# Patient Record
Sex: MALE | Race: BLACK OR AFRICAN AMERICAN | NOT HISPANIC OR LATINO | Employment: FULL TIME | URBAN - METROPOLITAN AREA
[De-identification: names, ages, dates, MRNs, and addresses within clinical notes are randomized per-mention and may not be internally consistent; named-entity substitution may affect disease eponyms.]

---

## 2018-07-09 ENCOUNTER — HOSPITAL ENCOUNTER (EMERGENCY)
Facility: HOSPITAL | Age: 23
Discharge: HOME | End: 2018-07-09
Attending: EMERGENCY MEDICINE

## 2018-07-09 VITALS
TEMPERATURE: 99 F | HEIGHT: 69 IN | OXYGEN SATURATION: 99 % | DIASTOLIC BLOOD PRESSURE: 86 MMHG | WEIGHT: 230 LBS | RESPIRATION RATE: 18 BRPM | BODY MASS INDEX: 34.07 KG/M2 | HEART RATE: 80 BPM | SYSTOLIC BLOOD PRESSURE: 124 MMHG

## 2018-07-09 DIAGNOSIS — E86.0 DEHYDRATION: ICD-10-CM

## 2018-07-09 DIAGNOSIS — R25.2 MUSCLE CRAMPING: ICD-10-CM

## 2018-07-09 DIAGNOSIS — R11.0 NAUSEA: Primary | ICD-10-CM

## 2018-07-09 LAB
ALBUMIN SERPL BCP-MCNC: 4.1 G/DL
ALP SERPL-CCNC: 53 U/L
ALT SERPL W/O P-5'-P-CCNC: 19 U/L
ANION GAP SERPL CALC-SCNC: 8 MMOL/L
AST SERPL-CCNC: 26 U/L
BASOPHILS # BLD AUTO: 0.01 K/UL
BASOPHILS NFR BLD: 0.2 %
BILIRUB SERPL-MCNC: 0.6 MG/DL
BILIRUB UR QL STRIP: NEGATIVE
BUN SERPL-MCNC: 15 MG/DL
CALCIUM SERPL-MCNC: 9.4 MG/DL
CHLORIDE SERPL-SCNC: 104 MMOL/L
CK SERPL-CCNC: 526 U/L
CLARITY UR: CLEAR
CO2 SERPL-SCNC: 28 MMOL/L
COLOR UR: YELLOW
CREAT SERPL-MCNC: 1.4 MG/DL
DIFFERENTIAL METHOD: NORMAL
EOSINOPHIL # BLD AUTO: 0.2 K/UL
EOSINOPHIL NFR BLD: 3.9 %
ERYTHROCYTE [DISTWIDTH] IN BLOOD BY AUTOMATED COUNT: 13.3 %
EST. GFR  (AFRICAN AMERICAN): >60 ML/MIN/1.73 M^2
EST. GFR  (NON AFRICAN AMERICAN): >60 ML/MIN/1.73 M^2
GLUCOSE SERPL-MCNC: 100 MG/DL
GLUCOSE UR QL STRIP: NEGATIVE
HCT VFR BLD AUTO: 42.8 %
HGB BLD-MCNC: 14.7 G/DL
HGB UR QL STRIP: NEGATIVE
KETONES UR QL STRIP: NEGATIVE
LEUKOCYTE ESTERASE UR QL STRIP: NEGATIVE
LYMPHOCYTES # BLD AUTO: 2.2 K/UL
LYMPHOCYTES NFR BLD: 40 %
MCH RBC QN AUTO: 29 PG
MCHC RBC AUTO-ENTMCNC: 34.3 G/DL
MCV RBC AUTO: 84 FL
MONOCYTES # BLD AUTO: 0.5 K/UL
MONOCYTES NFR BLD: 8.2 %
NEUTROPHILS # BLD AUTO: 2.7 K/UL
NEUTROPHILS NFR BLD: 47.5 %
NITRITE UR QL STRIP: NEGATIVE
PH UR STRIP: 7 [PH] (ref 5–8)
PLATELET # BLD AUTO: 276 K/UL
PMV BLD AUTO: 9.3 FL
POTASSIUM SERPL-SCNC: 4.1 MMOL/L
PROT SERPL-MCNC: 7.4 G/DL
PROT UR QL STRIP: NEGATIVE
RBC # BLD AUTO: 5.07 M/UL
SODIUM SERPL-SCNC: 140 MMOL/L
SP GR UR STRIP: 1.02 (ref 1–1.03)
URN SPEC COLLECT METH UR: ABNORMAL
UROBILINOGEN UR STRIP-ACNC: ABNORMAL EU/DL
WBC # BLD AUTO: 5.58 K/UL

## 2018-07-09 PROCEDURE — 80053 COMPREHEN METABOLIC PANEL: CPT

## 2018-07-09 PROCEDURE — 81003 URINALYSIS AUTO W/O SCOPE: CPT

## 2018-07-09 PROCEDURE — 96360 HYDRATION IV INFUSION INIT: CPT

## 2018-07-09 PROCEDURE — 25000003 PHARM REV CODE 250: Performed by: PHYSICIAN ASSISTANT

## 2018-07-09 PROCEDURE — 82550 ASSAY OF CK (CPK): CPT

## 2018-07-09 PROCEDURE — 85025 COMPLETE CBC W/AUTO DIFF WBC: CPT

## 2018-07-09 PROCEDURE — 99283 EMERGENCY DEPT VISIT LOW MDM: CPT | Mod: 25

## 2018-07-09 RX ADMIN — SODIUM CHLORIDE 1000 ML: 0.9 INJECTION, SOLUTION INTRAVENOUS at 01:07

## 2018-07-09 NOTE — ED PROVIDER NOTES
"Encounter Date: 7/9/2018    SCRIBE #1 NOTE: I, Kun Sudeep, am scribing for, and in the presence of,  Leona Humphrey NP. I have scribed the following portions of the note - Other sections scribed: HPI, ROS.       History     Chief Complaint   Patient presents with    Nausea     "I had a piece of baked chicken, and I got dizzy after and my left arm got numb". Pt states it lasted a couple of minutes, and now he just feels nauseous. 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 Male with no pertinent PMHx presents to the ED c/o nausea which began today after working outside. 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 He states he ate baked chicken earlier today. Pt had an episode of hand cramping while being outside today. Since being in the ED he reports his hand cramping is relieved and is not currently nauseous. Pt has not taken any meds for his symptoms. He denies abdominal pain.      The history is provided by the patient. No  was used.     Review of patient's allergies indicates:  No Known Allergies  History reviewed. No pertinent past medical history.  Past Surgical History:   Procedure Laterality Date    KNEE ARTHROSCOPY W/ LASER       History reviewed. No pertinent family history.  Social History   Substance Use Topics    Smoking status: Never Smoker    Smokeless tobacco: Never Used    Alcohol use No     Review of Systems   Constitutional: Negative for chills and fever.   HENT: Negative for ear pain, rhinorrhea and sore throat.    Eyes: Negative for redness.   Respiratory: Negative for shortness of breath.    Cardiovascular: Negative for chest pain.   Gastrointestinal: Positive for nausea. Negative for abdominal pain, diarrhea and vomiting.   Genitourinary: Negative for dysuria and hematuria.   Musculoskeletal: Negative for back pain and neck pain.        (+) hand cramping   Skin: Negative for rash. "   Neurological: Negative for weakness, numbness and headaches.   Hematological: Does not bruise/bleed easily.       Physical Exam     Initial Vitals [07/09/18 1321]   BP Pulse Resp Temp SpO2   (!) 143/91 84 16 98.8 °F (37.1 °C) 100 %      MAP       --         Physical Exam    Vitals reviewed.  Constitutional: He appears well-developed and well-nourished. He is not diaphoretic.  Non-toxic appearance. He does not have a sickly appearance. He does not appear ill. No distress.   HENT:   Head: Normocephalic and atraumatic.   Right Ear: External ear normal.   Left Ear: External ear normal.   Nose: Nose normal.   Mouth/Throat: Oropharynx is clear and moist. No oropharyngeal exudate.   Eyes: EOM are normal. Pupils are equal, round, and reactive to light.   Neck: Normal range of motion.   Cardiovascular: Normal rate, regular rhythm and normal heart sounds. Exam reveals no gallop and no friction rub.    No murmur heard.  Pulmonary/Chest: Breath sounds normal. No respiratory distress.   Abdominal: Soft. Bowel sounds are normal. He exhibits no distension and no mass. There is no tenderness. There is no rebound and no guarding.   Musculoskeletal: Normal range of motion.   Neurological: He is alert and oriented to person, place, and time. GCS eye subscore is 4. GCS verbal subscore is 5. GCS motor subscore is 6.   Skin: Skin is warm, dry and intact. No rash noted. No erythema.   Psychiatric: He has a normal mood and affect. Thought content normal.         ED Course   Procedures  Labs Reviewed   COMPREHENSIVE METABOLIC PANEL - Abnormal; Notable for the following:        Result Value    Alkaline Phosphatase 53 (*)     All other components within normal limits   CK - Abnormal; Notable for the following:      (*)     All other components within normal limits   URINALYSIS - Abnormal; Notable for the following:     Urobilinogen, UA 2.0-3.0 (*)     All other components within normal limits   CBC W/ AUTO DIFFERENTIAL           Imaging Results    None          Medical Decision Making:   ED Management:  This is evaluation of a 23-year-old male presenting with nausea and dizziness.  Physical Exam shows a non-toxic, afebrile, and well appearing male.  He appears well hydrated with moist mucous membranes.  On examination the abdomen is flat without distention.  There is no surface trauma, scars, incisions.  Bowel sounds are present in all 4 quadrants.  No tenderness, guarding, rigidity to palpation.  No tenderness, masses, pulsation in epigastric area.  No organomegaly.  Negative Patterson sign.  No periumbilical tenderness.  No rebound in the lower quadrants.  Nontender over McBurney's point.  No suprapubic tenderness or distention.  Good femoral pulses bilaterally.  No CVA tenderness.    Vital Signs Are Reassuring. If available, previous records reviewed.   RESULTS:   UA is negative for infection, no nitrites, leukocytes, blood, or protein present.  CBC was negativefor leukocytosis  indicating unlikely systemic infection, the H&H was stable and was within normal limits and indicating absence of anemia. The platelet count was normal indicating the absence of a tendency toward bleeding.  The chemistry was negative for hypo-or hyper natremia, kalemia, chloridemia, or other electrolyte abnormalities; BUN and creatinine were within normal limits indicating normal kidney function, ALT and AST were within normal limits indicating normal liver function, there was no transaminitis.    .    The patient was given IV fluids with good relief in symptoms. He is not orthostatic.  Blood work unremarkable.  CPK elevated at 526, however no signs of organ dysfunction or renal dysfunction.  He is tolerating oral intake.  No episodes of emesis in the ED.  He reports feeling much improved.  Will discharge home with instructions to increase oral fluid intake and follow up with PCP.    My overall impression is mild dehydration with muscle cramping. I  considered, but at this time, do not suspect rhabdomyolysis, severe dehydration, electrolyte imbalance, or an acute intra-abdominal process.    ED Course:  IV fluids. D/C Meds:  None. Additional D/C Information:  Increase oral fluid intake while working outside. The diagnosis, treatment plan, instructions for follow-up and reevaluation with the PCP as well as ED return precautions were discussed and understanding was verbalized. All questions or concerns have been addressed.     This case was discussed with Dr. Burk who is in agreement with my assessment and plan.             Scribe Attestation:   Scribe #1: I performed the above scribed service and the documentation accurately describes the services I performed. I attest to the accuracy of the note.    Attending Attestation:           Physician Attestation for Scribe:  Physician Attestation Statement for Scribe #1: I, Leona Humphrey NP, reviewed documentation, as scribed by Kun Sheets in my presence, and it is both accurate and complete.                    Clinical Impression:   The primary encounter diagnosis was Nausea. Diagnoses of Dehydration and Muscle cramping were also pertinent to this visit.      Disposition:   Disposition: Discharged  Condition: Stable                        Leona Humphrey NP  07/19/18 1404

## 2018-07-09 NOTE — DISCHARGE INSTRUCTIONS
Drink plenty of fluids to stay hydrated.  Take breaks while working outside to prevent dehydration and excessive sun exposure.      Please return to the Emergency Department for any new or worsening symptoms including: fever, chest pain, shortness of breath, loss of consciousness, dizziness, weakness, or any other concerns.     Please follow up with your Primary Care Provider within in the week. If you do not have one, you may contact the one listed on your discharge paperwork or you may also call the [facility] Appointment Desk at [phone] to schedule an appointment with one.

## 2018-07-09 NOTE — ED TRIAGE NOTES
Ate a piece of baked chicken got dizzy and cramps to hands left worse than right had been working outside feels a little better now

## 2019-04-27 ENCOUNTER — HOSPITAL ENCOUNTER (EMERGENCY)
Facility: HOSPITAL | Age: 24
Discharge: HOME | End: 2019-04-27
Attending: EMERGENCY MEDICINE

## 2019-04-27 VITALS
HEART RATE: 63 BPM | SYSTOLIC BLOOD PRESSURE: 122 MMHG | RESPIRATION RATE: 18 BRPM | TEMPERATURE: 98 F | HEIGHT: 69 IN | WEIGHT: 220 LBS | OXYGEN SATURATION: 99 % | DIASTOLIC BLOOD PRESSURE: 88 MMHG | BODY MASS INDEX: 32.58 KG/M2

## 2019-04-27 DIAGNOSIS — G56.92 COMPRESSION NEUROPATHY OF UPPER LIMB, LEFT: Primary | ICD-10-CM

## 2019-04-27 PROCEDURE — 99283 EMERGENCY DEPT VISIT LOW MDM: CPT

## 2019-04-27 RX ORDER — GABAPENTIN 300 MG/1
300 CAPSULE ORAL 3 TIMES DAILY
Qty: 45 CAPSULE | Refills: 0 | Status: SHIPPED | OUTPATIENT
Start: 2019-04-27 | End: 2021-08-06

## 2019-04-27 NOTE — ED PROVIDER NOTES
"Encounter Date: 4/27/2019  24 y.o. male with left hand discomfort and numbness in the palm and wrist x2 days. Patient will be seen by another provider for further evaluation when an exam room is available. Jose ROSSI, 10:07 AM    SCRIBE #1 NOTE: I, Michelle Lamar, am scribing for, and in the presence of,  Delfino Stoll MD. I have scribed the following portions of the note - Other sections scribed: HPI, ROS.       History     Chief Complaint   Patient presents with    Numbness     " My left arm has been numb for like two days".      CC: Numbness    HPI: This 24 y.o Male with a past medical history of knee surgery presents to the ED for emergent evaluation of numbess/tingling to the L forearm and L palm for x5 days. Patient reports he woke up with the symtoms the morning after weight lifting. Patient denies neck pain, arm pain, fever, chills, headache, otalgia, sore throat, dysuria, rash, facial numbness, or N/V/D. No alleviating or exacerbating factors reported.     The history is provided by the patient. No  was used.     Review of patient's allergies indicates:  No Known Allergies  History reviewed. No pertinent past medical history.  Past Surgical History:   Procedure Laterality Date    KNEE ARTHROSCOPY W/ LASER       History reviewed. No pertinent family history.  Social History     Tobacco Use    Smoking status: Never Smoker    Smokeless tobacco: Never Used   Substance Use Topics    Alcohol use: No    Drug use: No     Review of Systems   Constitutional: Negative for chills and fever.   HENT: Negative for congestion, ear discharge, ear pain, nosebleeds, rhinorrhea and sore throat.    Respiratory: Negative for cough and chest tightness.    Cardiovascular: Negative for chest pain.   Gastrointestinal: Negative for abdominal pain, diarrhea, nausea and vomiting.   Genitourinary: Negative for dysuria, flank pain, hematuria and urgency.   Musculoskeletal: Negative for back pain, myalgias " and neck pain.   Skin: Negative for rash.   Neurological: Positive for numbness (+) to left forearm and left palm. Negative for dizziness, weakness, light-headedness and headaches.        (+) tingling to L forearm and L palm     Psychiatric/Behavioral: Negative for agitation.       Physical Exam     Initial Vitals [04/27/19 1007]   BP Pulse Resp Temp SpO2   135/79 90 18 98 °F (36.7 °C) 100 %      MAP       --         Physical Exam  The patient was examined specifically for the following:   General:No significant distress, Good color, Warm and dry. Head and neck:Scalp atraumatic, Neck supple. Neurological:Appropriate conversation, Gross motor deficits. Eyes:Conjugate gaze, Clear corneas. ENT: No epistaxis. Cardiac: Regular rate and rhythm, Grossly normal heart tones. Pulmonary: Wheezing, Rales. Gastrointestinal: Abdominal tenderness, Abdominal distention. Musculoskeletal: Extremity deformity, Apparent pain with range of motion of the joints. Skin: Rash.   The findings on examination were normal.  Mental status examination, cranial nerves, motor and sensory examination are normal completely, even in the left upper extremity.  ED Course   Procedures  Labs Reviewed - No data to display       Imaging Results    None       Medical decision making:  Stroke is considered but the area paresthesia involves the volar forearm in the palm the hand not the fingers and nothing above the elbow.  Patient woke with his symptoms. I believe this is most likely compression neuropathy.  I will treat with gabapentin and have him follow up with Neurology.                Scribe Attestation:   Scribe #1: I performed the above scribed service and the documentation accurately describes the services I performed. I attest to the accuracy of the note.    Attending Attestation:           Physician Attestation for Scribe:  Physician Attestation Statement for Scribe #1: I, Delfino Stoll MD, reviewed documentation, as scribed by Michelle Lamar in my  presence, and it is both accurate and complete.                    Clinical Impression:       ICD-10-CM ICD-9-CM   1. Compression neuropathy of upper limb, left G56.92 354.9                                Delfino Stoll MD  04/27/19 1055

## 2019-04-27 NOTE — ED TRIAGE NOTES
"Pt reports left arm numbness since Tuesday. States " I work out everyday and I think I pinched a nerve" Denies numbness or weakness in any other part of the body. Full ROM, no swelling or redness noted   "

## 2019-04-27 NOTE — DISCHARGE INSTRUCTIONS
Gabapentin as directed.  Please follow-up with the neurologist above this week.  Return immediately if you get worse or if new problems develop.  Rest.

## 2021-08-04 ENCOUNTER — OCCUPATIONAL HEALTH (OUTPATIENT)
Dept: URGENT CARE | Facility: CLINIC | Age: 26
End: 2021-08-04
Payer: OTHER MISCELLANEOUS

## 2021-08-04 DIAGNOSIS — Z11.9 SCREENING EXAMINATION FOR UNSPECIFIED INFECTIOUS DISEASE: Primary | ICD-10-CM

## 2021-08-04 LAB
CTP QC/QA: YES
SARS-COV-2 RDRP RESP QL NAA+PROBE: NEGATIVE

## 2021-08-04 PROCEDURE — 99211 OFF/OP EST MAY X REQ PHY/QHP: CPT | Mod: S$GLB,,, | Performed by: NURSE PRACTITIONER

## 2021-08-04 PROCEDURE — 99211 PR OFFICE/OUTPT VISIT, EST, LEVL I: ICD-10-PCS | Mod: S$GLB,,, | Performed by: NURSE PRACTITIONER

## 2021-08-04 PROCEDURE — U0002: ICD-10-PCS | Mod: QW,S$GLB,, | Performed by: NURSE PRACTITIONER

## 2021-08-04 PROCEDURE — U0002 COVID-19 LAB TEST NON-CDC: HCPCS | Mod: QW,S$GLB,, | Performed by: NURSE PRACTITIONER

## 2021-08-06 ENCOUNTER — OFFICE VISIT (OUTPATIENT)
Dept: URGENT CARE | Facility: CLINIC | Age: 26
End: 2021-08-06
Payer: COMMERCIAL

## 2021-08-06 VITALS
BODY MASS INDEX: 32.58 KG/M2 | HEART RATE: 91 BPM | TEMPERATURE: 99 F | HEIGHT: 69 IN | WEIGHT: 220 LBS | DIASTOLIC BLOOD PRESSURE: 83 MMHG | SYSTOLIC BLOOD PRESSURE: 132 MMHG | OXYGEN SATURATION: 98 %

## 2021-08-06 DIAGNOSIS — R05.9 COUGH: ICD-10-CM

## 2021-08-06 DIAGNOSIS — U07.1 COVID-19 VIRUS INFECTION: Primary | ICD-10-CM

## 2021-08-06 LAB
CTP QC/QA: YES
SARS-COV-2 RDRP RESP QL NAA+PROBE: POSITIVE

## 2021-08-06 PROCEDURE — 1160F PR REVIEW ALL MEDS BY PRESCRIBER/CLIN PHARMACIST DOCUMENTED: ICD-10-PCS | Mod: CPTII,S$GLB,, | Performed by: PHYSICIAN ASSISTANT

## 2021-08-06 PROCEDURE — 3075F PR MOST RECENT SYSTOLIC BLOOD PRESS GE 130-139MM HG: ICD-10-PCS | Mod: CPTII,S$GLB,, | Performed by: PHYSICIAN ASSISTANT

## 2021-08-06 PROCEDURE — 3079F PR MOST RECENT DIASTOLIC BLOOD PRESSURE 80-89 MM HG: ICD-10-PCS | Mod: CPTII,S$GLB,, | Performed by: PHYSICIAN ASSISTANT

## 2021-08-06 PROCEDURE — 1159F MED LIST DOCD IN RCRD: CPT | Mod: CPTII,S$GLB,, | Performed by: PHYSICIAN ASSISTANT

## 2021-08-06 PROCEDURE — 1125F AMNT PAIN NOTED PAIN PRSNT: CPT | Mod: CPTII,S$GLB,, | Performed by: PHYSICIAN ASSISTANT

## 2021-08-06 PROCEDURE — 99214 OFFICE O/P EST MOD 30 MIN: CPT | Mod: S$GLB,,, | Performed by: PHYSICIAN ASSISTANT

## 2021-08-06 PROCEDURE — 99214 PR OFFICE/OUTPT VISIT, EST, LEVL IV, 30-39 MIN: ICD-10-PCS | Mod: S$GLB,,, | Performed by: PHYSICIAN ASSISTANT

## 2021-08-06 PROCEDURE — U0002 COVID-19 LAB TEST NON-CDC: HCPCS | Mod: QW,S$GLB,, | Performed by: PHYSICIAN ASSISTANT

## 2021-08-06 PROCEDURE — U0002: ICD-10-PCS | Mod: QW,S$GLB,, | Performed by: PHYSICIAN ASSISTANT

## 2021-08-06 PROCEDURE — 1125F PR PAIN SEVERITY QUANTIFIED, PAIN PRESENT: ICD-10-PCS | Mod: CPTII,S$GLB,, | Performed by: PHYSICIAN ASSISTANT

## 2021-08-06 PROCEDURE — 3008F PR BODY MASS INDEX (BMI) DOCUMENTED: ICD-10-PCS | Mod: CPTII,S$GLB,, | Performed by: PHYSICIAN ASSISTANT

## 2021-08-06 PROCEDURE — 1160F RVW MEDS BY RX/DR IN RCRD: CPT | Mod: CPTII,S$GLB,, | Performed by: PHYSICIAN ASSISTANT

## 2021-08-06 PROCEDURE — 3075F SYST BP GE 130 - 139MM HG: CPT | Mod: CPTII,S$GLB,, | Performed by: PHYSICIAN ASSISTANT

## 2021-08-06 PROCEDURE — 3008F BODY MASS INDEX DOCD: CPT | Mod: CPTII,S$GLB,, | Performed by: PHYSICIAN ASSISTANT

## 2021-08-06 PROCEDURE — 3079F DIAST BP 80-89 MM HG: CPT | Mod: CPTII,S$GLB,, | Performed by: PHYSICIAN ASSISTANT

## 2021-08-06 PROCEDURE — 1159F PR MEDICATION LIST DOCUMENTED IN MEDICAL RECORD: ICD-10-PCS | Mod: CPTII,S$GLB,, | Performed by: PHYSICIAN ASSISTANT

## 2021-08-06 RX ORDER — ONDANSETRON 4 MG/1
4 TABLET, ORALLY DISINTEGRATING ORAL EVERY 8 HOURS PRN
Qty: 15 TABLET | Refills: 0 | Status: SHIPPED | OUTPATIENT
Start: 2021-08-06

## 2021-08-06 RX ORDER — BENZONATATE 100 MG/1
200 CAPSULE ORAL 3 TIMES DAILY PRN
Qty: 40 CAPSULE | Refills: 0 | Status: SHIPPED | OUTPATIENT
Start: 2021-08-06 | End: 2021-08-13

## 2021-08-06 RX ORDER — IPRATROPIUM BROMIDE 21 UG/1
2 SPRAY, METERED NASAL 2 TIMES DAILY
Qty: 30 ML | Refills: 0 | Status: SHIPPED | OUTPATIENT
Start: 2021-08-06

## 2022-07-01 ENCOUNTER — OFFICE VISIT (OUTPATIENT)
Dept: URGENT CARE | Facility: CLINIC | Age: 27
End: 2022-07-01
Payer: OTHER MISCELLANEOUS

## 2022-07-01 VITALS
BODY MASS INDEX: 36.37 KG/M2 | SYSTOLIC BLOOD PRESSURE: 123 MMHG | WEIGHT: 245.56 LBS | DIASTOLIC BLOOD PRESSURE: 86 MMHG | OXYGEN SATURATION: 98 % | TEMPERATURE: 98 F | HEART RATE: 72 BPM | HEIGHT: 69 IN | RESPIRATION RATE: 16 BRPM

## 2022-07-01 DIAGNOSIS — M25.562 ACUTE PAIN OF LEFT KNEE: ICD-10-CM

## 2022-07-01 DIAGNOSIS — Z02.6 ENCOUNTER RELATED TO WORKER'S COMPENSATION CLAIM: ICD-10-CM

## 2022-07-01 DIAGNOSIS — S83.92XA SPRAIN OF LEFT KNEE, UNSPECIFIED LIGAMENT, INITIAL ENCOUNTER: Primary | ICD-10-CM

## 2022-07-01 PROCEDURE — 99203 OFFICE O/P NEW LOW 30 MIN: CPT | Mod: S$GLB,,, | Performed by: PHYSICIAN ASSISTANT

## 2022-07-01 PROCEDURE — 99203 PR OFFICE/OUTPT VISIT, NEW, LEVL III, 30-44 MIN: ICD-10-PCS | Mod: S$GLB,,, | Performed by: PHYSICIAN ASSISTANT

## 2022-07-01 PROCEDURE — 73564 X-RAY EXAM KNEE 4 OR MORE: CPT | Mod: LT,S$GLB,, | Performed by: RADIOLOGY

## 2022-07-01 PROCEDURE — 73564 XR KNEE COMP 4 OR MORE VIEWS LEFT: ICD-10-PCS | Mod: LT,S$GLB,, | Performed by: RADIOLOGY

## 2022-07-01 RX ORDER — NAPROXEN 500 MG/1
500 TABLET ORAL 2 TIMES DAILY WITH MEALS
Qty: 30 TABLET | Refills: 0 | Status: SHIPPED | OUTPATIENT
Start: 2022-07-01 | End: 2023-04-26

## 2022-07-01 NOTE — LETTER
Urgent Care - [address] [address]  Phone: [phone]  Fax: [phone]  [facility]: [id number]     Name: Kun Strong  Injury Date: 06/30/2022   Employee ID: [id number] Date of First Treatment: 07/01/2022   Company: [employer]      Appointment Time: 08:25 AM Arrived: 08:25 AM   Provider: Harris Munoz PA-C Time Out: 09:30 AM     Office Treatment:   1. Sprain of left knee, unspecified ligament, initial encounter    2. Encounter related to worker's compensation claim    3. Acute pain of left knee      Medications Ordered This Encounter   Medications    naproxen (NAPROSYN) 500 MG tablet           Restrictions: Avoid climbing/kneeling/squatting, Avoid frequent bending/lifting/twisting, No Prolonged standing/walking     Return Appointment: 7/8/2022 at 10:30 AM    BG

## 2022-07-01 NOTE — PROGRESS NOTES
Subjective:       Patient ID: Kun Strong is a 27 y.o. male.    Chief Complaint: Knee Injury    NV, Left Knee, (DOI:6/30/22), [employer]- Patient works as a . 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 Patient states when the injury first happened pain level was a 10 now he is a 6. He did take OTC Tylenol. Patient didn't go to the ER but did go home ice and rest his knee. He states his knee feel much better extend. If he bend his knee he hears popping. He have been walking with a limp to not put all his weight on his knee. He does have radiating pain up to his thigh. BG    27-year-old male presents with acute left knee pain.  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  Patient states she used ice and Tylenol with some improvement.  He reports pain with walking today, popping.  Denies locking or giving out.  Patient denies previous left knee injury. MEB    Knee Injury  This is a new problem. The current episode started yesterday. The problem occurs intermittently. The problem has been unchanged. Associated symptoms include arthralgias. Pertinent negatives include no abdominal pain, anorexia, change in bowel habit, chest pain, chills, congestion, coughing, diaphoresis, fatigue, fever, headaches, joint swelling, myalgias, nausea, neck pain, numbness, rash, sore throat, swollen glands, urinary symptoms, vertigo, visual change, vomiting or weakness. The symptoms are aggravated by standing, twisting, walking and bending. He has tried nothing for the symptoms.       Constitution: Negative for chills, sweating, fatigue and fever.   HENT: Negative for congestion and sore throat.    Neck: Negative for neck pain.   Cardiovascular: Negative for chest pain.   Respiratory: Negative for cough.    Gastrointestinal: Negative for abdominal pain, nausea  and vomiting.   Musculoskeletal: Positive for joint pain, abnormal ROM of joint and pain with walking. Negative for joint swelling and muscle ache.   Skin: Negative for rash.   Neurological: Negative for history of vertigo, headaches, numbness and tingling.        Objective:      Physical Exam  Vitals and nursing note reviewed.   Constitutional:       General: He is not in acute distress.     Appearance: He is well-developed. He is not diaphoretic.   HENT:      Head: Normocephalic and atraumatic.      Right Ear: Hearing and external ear normal.      Left Ear: Hearing and external ear normal.      Nose: Nose normal. No nasal deformity.   Eyes:      General: Lids are normal. No scleral icterus.     Conjunctiva/sclera: Conjunctivae normal.   Neck:      Trachea: Trachea normal.   Cardiovascular:      Pulses: Normal pulses.   Pulmonary:      Effort: Pulmonary effort is normal. No respiratory distress.      Breath sounds: No stridor.   Musculoskeletal:      Cervical back: Normal range of motion.      Left knee: No swelling, effusion or bony tenderness. Decreased range of motion (AROM:  Flexion 45°, extension 0°). Tenderness present over the lateral joint line, MCL and LCL. No medial joint line or patellar tendon tenderness. No LCL laxity ( pain with varus stress), MCL laxity ( pain with valgus stress) or ACL laxity.Abnormal meniscus. Normal pulse.      Instability Tests: Anterior Lachman test negative. Medial Hudson test positive. Lateral Hudson test negative.        Legs:    Skin:     General: Skin is warm and dry.      Capillary Refill: Capillary refill takes less than 2 seconds.   Neurological:      Mental Status: He is alert. He is not disoriented.      GCS: GCS eye subscore is 4. GCS verbal subscore is 5. GCS motor subscore is 6.      Sensory: No sensory deficit.   Psychiatric:         Attention and Perception: He is attentive.         Speech: Speech normal.         Behavior: Behavior normal.           X-Ray Knee  Complete 4 or More Views Left    Result Date: 7/1/2022  EXAMINATION: XR KNEE COMP 4 OR MORE VIEWS LEFT CLINICAL HISTORY: Pain in left knee TECHNIQUE: Three views of the left knee. COMPARISON: None FINDINGS: No definite evidence of acute fracture or dislocation.  Joint spaces maintained.  No evidence of radiopaque foreign body.  No large knee joint effusion. No definite acute findings involving the partially imaged right knee.  Well corticated deformity of the proximal right fibular diaphysis could relate to healed remote fracture.  Correlation advised.     As above. Electronically signed by: Isak Jimenes Date:    07/01/2022 Time:    09:12    Assessment:       1. Sprain of left knee, unspecified ligament, initial encounter    2. Encounter related to worker's compensation claim    3. Acute pain of left knee        Plan:         27-year-old male with acute left knee pain.  Likely sprain of the MCL however showing signs of meniscal and LCL injury.  Rice therapy, naproxen, knee brace ordered.  Will place on light duty and return to clinic in 1 week for reassessment.    Medications Ordered This Encounter   Medications    naproxen (NAPROSYN) 500 MG tablet     Sig: Take 1 tablet (500 mg total) by mouth 2 (two) times daily with meals.     Dispense:  30 tablet     Refill:  0         Restrictions: Avoid climbing/kneeling/squatting, Avoid frequent bending/lifting/twisting, No Prolonged standing/walking  Follow up in about 1 week (around 7/8/2022).

## 2023-04-26 ENCOUNTER — OFFICE VISIT (OUTPATIENT)
Dept: URGENT CARE | Facility: CLINIC | Age: 28
End: 2023-04-26
Payer: COMMERCIAL

## 2023-04-26 VITALS
TEMPERATURE: 98 F | SYSTOLIC BLOOD PRESSURE: 131 MMHG | HEART RATE: 80 BPM | HEIGHT: 69 IN | OXYGEN SATURATION: 99 % | BODY MASS INDEX: 36.88 KG/M2 | DIASTOLIC BLOOD PRESSURE: 78 MMHG | WEIGHT: 249 LBS
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
## 2023-04-26 DIAGNOSIS — Z02.6 ENCOUNTER RELATED TO WORKER'S COMPENSATION CLAIM: ICD-10-CM

## 2023-04-26 DIAGNOSIS — S39.012A ACUTE MYOFASCIAL STRAIN OF LUMBAR REGION, INITIAL ENCOUNTER: Primary | ICD-10-CM

## 2023-04-26 DIAGNOSIS — M54.9 DORSALGIA, UNSPECIFIED: ICD-10-CM

## 2023-04-26 PROCEDURE — 99213 PR OFFICE/OUTPT VISIT, EST, LEVL III, 20-29 MIN: ICD-10-PCS | Mod: 25,S$GLB,, | Performed by: PHYSICIAN ASSISTANT

## 2023-04-26 PROCEDURE — 99213 OFFICE O/P EST LOW 20 MIN: CPT | Mod: 25,S$GLB,, | Performed by: PHYSICIAN ASSISTANT

## 2023-04-26 PROCEDURE — 72100 X-RAY EXAM L-S SPINE 2/3 VWS: CPT | Mod: S$GLB,,, | Performed by: RADIOLOGY

## 2023-04-26 PROCEDURE — 96372 THER/PROPH/DIAG INJ SC/IM: CPT | Mod: S$GLB,,, | Performed by: PHYSICIAN ASSISTANT

## 2023-04-26 PROCEDURE — 72100 XR LUMBAR SPINE 2 OR 3 VIEWS: ICD-10-PCS | Mod: S$GLB,,, | Performed by: RADIOLOGY

## 2023-04-26 PROCEDURE — 96372 PR INJECTION,THERAP/PROPH/DIAG2ST, IM OR SUBCUT: ICD-10-PCS | Mod: S$GLB,,, | Performed by: PHYSICIAN ASSISTANT

## 2023-04-26 RX ORDER — CYCLOBENZAPRINE HCL 10 MG
10 TABLET ORAL 2 TIMES DAILY PRN
Qty: 20 TABLET | Refills: 0 | Status: SHIPPED | OUTPATIENT
Start: 2023-04-26 | End: 2023-05-03

## 2023-04-26 RX ORDER — KETOROLAC TROMETHAMINE 30 MG/ML
30 INJECTION, SOLUTION INTRAMUSCULAR; INTRAVENOUS
Status: COMPLETED | OUTPATIENT
Start: 2023-04-26 | End: 2023-04-26

## 2023-04-26 RX ORDER — NAPROXEN 500 MG/1
500 TABLET ORAL 2 TIMES DAILY WITH MEALS
Qty: 30 TABLET | Refills: 0 | Status: SHIPPED | OUTPATIENT
Start: 2023-04-26

## 2023-04-26 RX ADMIN — KETOROLAC TROMETHAMINE 30 MG: 30 INJECTION, SOLUTION INTRAMUSCULAR; INTRAVENOUS at 03:04

## 2023-04-26 NOTE — LETTER
Urgent Care - [address] [address]  Phone: [phone]  Fax: [phone]  [facility]: [id number]    Pt Name: Kun Strong  Injury Date: 04/26/2023   Employee ID: [id number] Date of First Treatment: 04/26/2023   Company: [employer]      Appointment Time: 02:40 PM Arrived: 2:50 PM   Provider: Harris Munoz PA-C Time Out: 4:07 PM     Office Treatment:   1. Acute myofascial strain of lumbar region, initial encounter    2. Encounter related to worker's compensation claim    3. Dorsalgia, unspecified    4. Work related injury      Medications Ordered This Encounter   Medications    cyclobenzaprine (FLEXERIL) 10 MG tablet    ketorolac injection 30 mg    naproxen (NAPROSYN) 500 MG tablet      Patient Instructions: Apply ice 24-48 hours then apply heat/warm soaks      Restrictions: Avoid frequent bending/lifting/twisting, No lifting/pushing/pulling more than 25 lbs     Return Appointment: 5/3/2023 at 10:00 AM    palmer

## 2023-04-26 NOTE — PROGRESS NOTES
Subjective:      Patient ID: Kun Strong is a 28 y.o. male.    Chief Complaint: Back Pain (Lower Back injury/pain)    Patient's place of employment - [employer]  Patient's job title -    Date of injury - 04/26/2023  Body part injured including left or right - Lower Back   Injury Mechanism - Lifting a patient   What they were doing when they got hurt - Working   What they did immediately after - [facility] [place]  Pain scale right now - 8/10    Ksd    28-year-old male presents with acute low back pain that occurred earlier today.  Patient states he was trying to lift an elderly person off the ground who weighed 300 pounds when he felt acute pain in the low back with radiation to the right leg mid thigh.  He denies bowel or bladder incontinence or saddle anesthesia.  He denies previous low back injury.  Says he took a leave with mild relief.  Reports pain is 8/10 in the mid low back and sacral region. MEB    Back Pain  Associated symptoms include numbness. Pertinent negatives include no bowel incontinence or chest pain.   Constitution: Positive for activity change.   HENT:  Negative for facial trauma.    Neck: Negative for neck pain.   Cardiovascular:  Negative for chest trauma and chest pain.   Eyes:  Negative for eye trauma.   Respiratory:  Negative for shortness of breath.    Gastrointestinal:  Negative for bowel incontinence.   Musculoskeletal:  Positive for back pain.   Skin:  Negative for wound and bruising.   Neurological:  Positive for numbness and tingling.   Objective:     Physical Exam  Vitals and nursing note reviewed.   Constitutional:       General: He is not in acute distress.     Appearance: Normal appearance. He is well-developed.   HENT:      Head: Normocephalic and atraumatic.      Right Ear: Hearing and external ear normal.      Left Ear: Hearing and external ear normal.      Nose: Nose normal. No nasal deformity.   Eyes:      General: Lids are normal.      Conjunctiva/sclera: Conjunctivae normal.       Right eye: Right conjunctiva is not injected.      Left eye: Left conjunctiva is not injected.   Neck:      Trachea: Trachea normal.   Cardiovascular:      Pulses: Normal pulses.           Dorsalis pedis pulses are 2+ on the right side and 2+ on the left side.        Posterior tibial pulses are 2+ on the right side and 2+ on the left side.   Pulmonary:      Effort: Pulmonary effort is normal. No respiratory distress.      Breath sounds: No stridor.   Musculoskeletal:      Cervical back: Normal and normal range of motion. No spinous process tenderness or muscular tenderness.      Thoracic back: Normal.      Lumbar back: Tenderness present. No deformity. Decreased range of motion. Negative right straight leg raise test and negative left straight leg raise test.        Back:    Skin:     General: Skin is warm and dry.      Findings: No abrasion or bruising.   Neurological:      General: No focal deficit present.      Mental Status: He is alert.      GCS: GCS eye subscore is 4. GCS verbal subscore is 5. GCS motor subscore is 6.      Sensory: Sensation is intact. No sensory deficit.      Motor: Motor function is intact. No weakness (BLE strength 5/5).      Deep Tendon Reflexes: Reflexes are normal and symmetric.      Reflex Scores:       Patellar reflexes are 2+ on the right side and 2+ on the left side.       Achilles reflexes are 2+ on the right side and 2+ on the left side.  Psychiatric:         Attention and Perception: He is attentive.         Speech: Speech normal.         Behavior: Behavior normal.         Thought Content: Thought content normal.        X-Ray Lumbar Spine 2 Or 3 Views    Result Date: 4/26/2023  EXAMINATION: XR LUMBAR SPINE 2 OR 3 VIEWS CLINICAL HISTORY: Low back pain, no red flags, no prior management;WORKERS COMP;  Dorsalgia, unspecified TECHNIQUE: Lumbar spine three views COMPARISON: None FINDINGS: Lumbosacral disc space is slightly narrowed.  The other disc spaces are well maintained.  No  fracture, spondylolisthesis or bone destruction identified     See above Electronically signed by: Delfino Poole MD Date:    04/26/2023 Time:    15:46     Assessment:      1. Acute myofascial strain of lumbar region, initial encounter    2. Encounter related to worker's compensation claim    3. Dorsalgia, unspecified    4. Work related injury      Plan:       Medications Ordered This Encounter   Medications    cyclobenzaprine (FLEXERIL) 10 MG tablet     Sig: Take 1 tablet (10 mg total) by mouth 2 (two) times daily as needed for Muscle spasms. Take off duty only. May cause drowsiness.     Dispense:  20 tablet     Refill:  0    ketorolac injection 30 mg    naproxen (NAPROSYN) 500 MG tablet     Sig: Take 1 tablet (500 mg total) by mouth 2 (two) times daily with meals.     Dispense:  30 tablet     Refill:  0     Patient Instructions: Apply ice 24-48 hours then apply heat/warm soaks   Restrictions: Avoid frequent bending/lifting/twisting, No lifting/pushing/pulling more than 25 lbs  Follow up in about 1 week (around 5/3/2023).

## 2023-05-02 ENCOUNTER — OFFICE VISIT (OUTPATIENT)
Dept: URGENT CARE | Facility: CLINIC | Age: 28
End: 2023-05-02
Payer: COMMERCIAL

## 2023-05-02 VITALS
SYSTOLIC BLOOD PRESSURE: 126 MMHG | DIASTOLIC BLOOD PRESSURE: 85 MMHG | HEART RATE: 67 BPM | TEMPERATURE: 98 F | OXYGEN SATURATION: 96 %

## 2023-05-02 DIAGNOSIS — S39.012D ACUTE MYOFASCIAL STRAIN OF LUMBAR REGION, SUBSEQUENT ENCOUNTER: Primary | ICD-10-CM

## 2023-05-02 DIAGNOSIS — Z02.6 ENCOUNTER RELATED TO WORKER'S COMPENSATION CLAIM: ICD-10-CM
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
## 2023-05-02 PROCEDURE — 99213 OFFICE O/P EST LOW 20 MIN: CPT | Mod: S$GLB,,, | Performed by: PHYSICIAN ASSISTANT

## 2023-05-02 PROCEDURE — 99213 PR OFFICE/OUTPT VISIT, EST, LEVL III, 20-29 MIN: ICD-10-PCS | Mod: S$GLB,,, | Performed by: PHYSICIAN ASSISTANT

## 2023-05-02 NOTE — LETTER
Urgent Care - [address] [address]  Phone: [phone]  Fax: [phone]  [facility]: [phone]    Pt Name: Kun Strong  Injury Date: 04/26/2023   Employee ID: [id number] Date of Treatment: 05/02/2023   Company: [employer]      Appointment Time: 09:45 AM Arrived:9:55am   Provider: Harris Munoz PA-C Time Out:10:45am     Office Treatment:   1. Acute myofascial strain of lumbar region, subsequent encounter    2. Encounter related to worker's compensation claim    3. Work related injury               Restrictions: Regular Duty, Discharged from Occupational Health      Regular Duty, Discharged from Occupational Health    KW

## 2023-05-02 NOTE — PROGRESS NOTES
Subjective:      Patient ID: Kun Strong is a 28 y.o. male.    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  He reports much improved since last office visit.  Says he is ready to go back to regular duty. MEB    Back Pain  Pertinent negatives include no numbness.   Constitution: Negative for activity change.   Musculoskeletal:  Negative for pain, back pain and pain with walking.   Neurological:  Negative for numbness and tingling.   Objective:     Physical Exam  Vitals and nursing note reviewed.   Constitutional:       General: He is not in acute distress.     Appearance: Normal appearance. He is well-developed.   HENT:      Head: Normocephalic and atraumatic.      Right Ear: Hearing and external ear normal.      Left Ear: Hearing and external ear normal.      Nose: Nose normal. No nasal deformity.   Eyes:      General: Lids are normal.      Conjunctiva/sclera: Conjunctivae normal.      Right eye: Right conjunctiva is not injected.      Left eye: Left conjunctiva is not injected.   Neck:      Trachea: Trachea normal.   Cardiovascular:      Pulses: Normal pulses.           Dorsalis pedis pulses are 2+ on the right side and 2+ on the left side.        Posterior tibial pulses are 2+ on the right side and 2+ on the left side.   Pulmonary:      Effort: Pulmonary effort is normal. No respiratory distress.      Breath sounds: No stridor.   Musculoskeletal:      Cervical back: Normal and normal range of motion. No spinous process tenderness or muscular tenderness.      Thoracic back: Normal.      Lumbar back: No deformity or tenderness. Normal range of motion. Negative right straight leg raise test and negative left straight leg  raise test.   Skin:     General: Skin is warm and dry.      Findings: No abrasion or bruising.   Neurological:      General: No focal deficit present.      Mental Status: He is alert.      GCS: GCS eye subscore is 4. GCS verbal subscore is 5. GCS motor subscore is 6.      Sensory: Sensation is intact. No sensory deficit.      Motor: Motor function is intact. No weakness (BLE strength 5/5).      Deep Tendon Reflexes:      Reflex Scores:       Patellar reflexes are 1+ on the right side and 1+ on the left side.       Achilles reflexes are 1+ on the right side and 1+ on the left side.  Psychiatric:         Attention and Perception: He is attentive.         Speech: Speech normal.         Behavior: Behavior normal.         Thought Content: Thought content normal.      Assessment:      1. Acute myofascial strain of lumbar region, subsequent encounter    2. Encounter related to worker's compensation claim    3. Work related injury      Plan:     Patient has reached MMI.  Will discharge to regular duty with instructions to follow-up as needed.  Patient verbalized understanding and agreement.         Restrictions: Regular Duty, Discharged from Occupational Health  Follow up if symptoms worsen or fail to improve.

## 2023-07-19 ENCOUNTER — PATIENT MESSAGE (OUTPATIENT)
Dept: RESEARCH | Facility: HOSPITAL | Age: 28
End: 2023-07-19
Payer: COMMERCIAL

## 2025-01-29 ENCOUNTER — HOSPITAL ENCOUNTER (EMERGENCY)
Facility: HOSPITAL | Age: 30
Discharge: HOME | End: 2025-01-29
Attending: STUDENT IN AN ORGANIZED HEALTH CARE EDUCATION/TRAINING PROGRAM
Payer: OTHER MISCELLANEOUS

## 2025-01-29 ENCOUNTER — APPOINTMENT (OUTPATIENT)
Dept: RADIOLOGY | Facility: HOSPITAL | Age: 30
End: 2025-01-29
Attending: STUDENT IN AN ORGANIZED HEALTH CARE EDUCATION/TRAINING PROGRAM
Payer: COMMERCIAL

## 2025-01-29 VITALS
RESPIRATION RATE: 20 BRPM | DIASTOLIC BLOOD PRESSURE: 60 MMHG | TEMPERATURE: 98 F | HEIGHT: 69 IN | BODY MASS INDEX: 34.07 KG/M2 | OXYGEN SATURATION: 95 % | WEIGHT: 230 LBS | SYSTOLIC BLOOD PRESSURE: 131 MMHG | HEART RATE: 85 BPM

## 2025-01-29 DIAGNOSIS — S83.006A PATELLAR DISLOCATION: Primary | ICD-10-CM

## 2025-01-29 DIAGNOSIS — M25.569 KNEE PAIN: ICD-10-CM

## 2025-01-29 LAB
ALBUMIN SERPL BCP-MCNC: 3.7 G/DL (ref 3.5–5.2)
ALP SERPL-CCNC: 49 U/L (ref 40–150)
ALT SERPL W/O P-5'-P-CCNC: 17 U/L (ref 10–44)
ANION GAP SERPL CALC-SCNC: 10 MMOL/L (ref 8–16)
AST SERPL-CCNC: 23 U/L (ref 10–40)
BASOPHILS # BLD AUTO: 0.03 K/UL (ref 0–0.2)
BASOPHILS NFR BLD: 0.6 % (ref 0–1.9)
BILIRUB SERPL-MCNC: 0.8 MG/DL (ref 0.1–1)
BUN SERPL-MCNC: 11 MG/DL (ref 6–20)
CALCIUM SERPL-MCNC: 8.3 MG/DL (ref 8.7–10.5)
CHLORIDE SERPL-SCNC: 108 MMOL/L (ref 95–110)
CO2 SERPL-SCNC: 21 MMOL/L (ref 23–29)
CREAT SERPL-MCNC: 1.4 MG/DL (ref 0.5–1.4)
DIFFERENTIAL METHOD BLD: NORMAL
EOSINOPHIL # BLD AUTO: 0 K/UL (ref 0–0.5)
EOSINOPHIL NFR BLD: 0.6 % (ref 0–8)
ERYTHROCYTE [DISTWIDTH] IN BLOOD BY AUTOMATED COUNT: 12.9 % (ref 11.5–14.5)
EST. GFR  (NO RACE VARIABLE): >60 ML/MIN/1.73 M^2
GLUCOSE SERPL-MCNC: 113 MG/DL (ref 70–110)
HCT VFR BLD AUTO: 43.6 % (ref 40–54)
HGB BLD-MCNC: 14.8 G/DL (ref 14–18)
IMM GRANULOCYTES # BLD AUTO: 0.01 K/UL (ref 0–0.04)
IMM GRANULOCYTES NFR BLD AUTO: 0.2 % (ref 0–0.5)
INR PPP: 1 (ref 0.8–1.2)
LYMPHOCYTES # BLD AUTO: 1.4 K/UL (ref 1–4.8)
LYMPHOCYTES NFR BLD: 25.8 % (ref 18–48)
MCH RBC QN AUTO: 29.6 PG (ref 27–31)
MCHC RBC AUTO-ENTMCNC: 33.9 G/DL (ref 32–36)
MCV RBC AUTO: 87 FL (ref 82–98)
MONOCYTES # BLD AUTO: 0.4 K/UL (ref 0.3–1)
MONOCYTES NFR BLD: 7 % (ref 4–15)
NEUTROPHILS # BLD AUTO: 3.5 K/UL (ref 1.8–7.7)
NEUTROPHILS NFR BLD: 65.8 % (ref 38–73)
NRBC BLD-RTO: 0 /100 WBC
PLATELET # BLD AUTO: 269 K/UL (ref 150–450)
PMV BLD AUTO: 9.2 FL (ref 9.2–12.9)
POTASSIUM SERPL-SCNC: 3.8 MMOL/L (ref 3.5–5.1)
PROT SERPL-MCNC: 6.5 G/DL (ref 6–8.4)
PROTHROMBIN TIME: 11.6 SEC (ref 9–12.5)
RBC # BLD AUTO: 5 M/UL (ref 4.6–6.2)
SODIUM SERPL-SCNC: 139 MMOL/L (ref 136–145)
WBC # BLD AUTO: 5.28 K/UL (ref 3.9–12.7)

## 2025-01-29 PROCEDURE — 85025 COMPLETE CBC W/AUTO DIFF WBC: CPT | Performed by: STUDENT IN AN ORGANIZED HEALTH CARE EDUCATION/TRAINING PROGRAM

## 2025-01-29 PROCEDURE — 25000003 PHARM REV CODE 250: Performed by: STUDENT IN AN ORGANIZED HEALTH CARE EDUCATION/TRAINING PROGRAM

## 2025-01-29 PROCEDURE — 63600175 PHARM REV CODE 636 W HCPCS: Mod: TB,JZ | Performed by: STUDENT IN AN ORGANIZED HEALTH CARE EDUCATION/TRAINING PROGRAM

## 2025-01-29 PROCEDURE — 96375 TX/PRO/DX INJ NEW DRUG ADDON: CPT

## 2025-01-29 PROCEDURE — 85610 PROTHROMBIN TIME: CPT | Performed by: STUDENT IN AN ORGANIZED HEALTH CARE EDUCATION/TRAINING PROGRAM

## 2025-01-29 PROCEDURE — 73562 X-RAY EXAM OF KNEE 3: CPT | Mod: 26,LT,, | Performed by: RADIOLOGY

## 2025-01-29 PROCEDURE — 96376 TX/PRO/DX INJ SAME DRUG ADON: CPT

## 2025-01-29 PROCEDURE — 99285 EMERGENCY DEPT VISIT HI MDM: CPT | Mod: 25

## 2025-01-29 PROCEDURE — 27560 TREAT KNEECAP DISLOCATION: CPT | Mod: LT

## 2025-01-29 PROCEDURE — 80053 COMPREHEN METABOLIC PANEL: CPT | Performed by: STUDENT IN AN ORGANIZED HEALTH CARE EDUCATION/TRAINING PROGRAM

## 2025-01-29 PROCEDURE — 96374 THER/PROPH/DIAG INJ IV PUSH: CPT

## 2025-01-29 PROCEDURE — 73562 X-RAY EXAM OF KNEE 3: CPT | Mod: TC,FY,LT

## 2025-01-29 RX ORDER — PROPOFOL 10 MG/ML
0.5 VIAL (ML) INTRAVENOUS
Status: DISCONTINUED | OUTPATIENT
Start: 2025-01-29 | End: 2025-01-30 | Stop reason: HOSPADM

## 2025-01-29 RX ORDER — HYDROMORPHONE HYDROCHLORIDE 1 MG/ML
1 INJECTION, SOLUTION INTRAMUSCULAR; INTRAVENOUS; SUBCUTANEOUS
Status: COMPLETED | OUTPATIENT
Start: 2025-01-29 | End: 2025-01-29

## 2025-01-29 RX ORDER — ONDANSETRON 4 MG/1
4 TABLET, ORALLY DISINTEGRATING ORAL
Status: COMPLETED | OUTPATIENT
Start: 2025-01-29 | End: 2025-01-29

## 2025-01-29 RX ORDER — PROPOFOL 10 MG/ML
0.5 VIAL (ML) INTRAVENOUS
Status: COMPLETED | OUTPATIENT
Start: 2025-01-29 | End: 2025-01-29

## 2025-01-29 RX ORDER — KETAMINE HYDROCHLORIDE 100 MG/ML
0.3 INJECTION, SOLUTION INTRAMUSCULAR; INTRAVENOUS EVERY 5 MIN PRN
Status: DISCONTINUED | OUTPATIENT
Start: 2025-01-29 | End: 2025-01-30 | Stop reason: HOSPADM

## 2025-01-29 RX ADMIN — ONDANSETRON 4 MG: 4 TABLET, ORALLY DISINTEGRATING ORAL at 09:01

## 2025-01-29 RX ADMIN — HYDROMORPHONE HYDROCHLORIDE 1 MG: 1 INJECTION, SOLUTION INTRAMUSCULAR; INTRAVENOUS; SUBCUTANEOUS at 07:01

## 2025-01-29 RX ADMIN — KETAMINE HYDROCHLORIDE 30 MG: 100 INJECTION INTRAMUSCULAR; INTRAVENOUS at 08:01

## 2025-01-29 RX ADMIN — PROPOFOL 52 MG: 10 INJECTION, EMULSION INTRAVENOUS at 08:01

## 2025-01-29 RX ADMIN — SODIUM CHLORIDE 1000 ML: 9 INJECTION, SOLUTION INTRAVENOUS at 08:01

## 2025-01-29 RX ADMIN — HYDROMORPHONE HYDROCHLORIDE 1 MG: 1 INJECTION, SOLUTION INTRAMUSCULAR; INTRAVENOUS; SUBCUTANEOUS at 04:01

## 2025-01-29 NOTE — Clinical Note
"Kun Ac" Tae was seen and treated in our emergency department on 1/29/2025.  He may return to work on 02/10/2025.  After being cleared by orthopedics      If you have any questions or concerns, please don't hesitate to call.      Starla Hidalgo MD"

## 2025-01-29 NOTE — ED PROVIDER NOTES
"Encounter Date: 1/29/2025    SCRIBE #1 NOTE: I, Chelsea Choi, am scribing for, and in the presence of,  Starla Hidalgo MD. I have scribed the following portions of the note - Other sections scribed: HPI, ROS, PE.       History     Chief Complaint   Patient presents with    Knee Injury     Pt BIB EMS, c/o left knee pain. Pt reports doing drills and "twisting his knee". EMS reports positive deformity. Pt c/o of leg numbness below knee. Positive pulses per EMS. Pt denies any head, neck, or back pain. Pt denies any CP, SOB, abd pain or n/v/d     Patient is a 29 y.o. male, with a PMHx of orthopedic surgery and tendon repair to right knee, who presents to the ED with left knee pain s/p injury PTA. 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 Notes he is unable to move the knee or ambulate with it, EMS reports positive deformity. Denies hitting his head or LOC in the fall. Notes he previously had a tear of his patellar tendon of his right knee that needed orthopedic repair. No other exacerbating or alleviating factors. Denies ankle pain or other associated symptoms.  Patient received 100 of fentanyl prior to arrival that very minimally improved his pain        The history is provided by the patient and the EMS personnel. No  was used.     Review of patient's allergies indicates:  No Known Allergies  History reviewed. No pertinent past medical history.  Past Surgical History:   Procedure Laterality Date    KNEE ARTHROSCOPY W/ LASER      KNEE SURGERY       Family History   Problem Relation Name Age of Onset    No Known Problems Mother      No Known Problems Father       Social History     Tobacco Use    Smoking status: Never    Smokeless tobacco: Never   Substance Use Topics    Alcohol use: No    Drug use: No     Review of Systems   Constitutional:  Negative for fever.   HENT:  Negative " for sore throat.    Respiratory:  Negative for cough and shortness of breath.    Cardiovascular:  Negative for chest pain and leg swelling.   Gastrointestinal:  Negative for abdominal pain, diarrhea, nausea and vomiting.   Genitourinary:  Negative for dysuria and hematuria.   Musculoskeletal:  Positive for arthralgias (L knee). Negative for back pain and neck pain.   Skin:  Negative for rash.   Neurological:  Negative for syncope.       Physical Exam     Initial Vitals [01/29/25 1554]   BP Pulse Resp Temp SpO2   125/70 97 20 97.6 °F (36.4 °C) 97 %      MAP       --         Physical Exam    Nursing note and vitals reviewed.  Constitutional: He appears well-developed. He is not diaphoretic. No distress.   HENT:   Head: Normocephalic.   Eyes: EOM are normal.   Cardiovascular:  Normal rate and regular rhythm.           No murmur heard.  Palpable normal left DP/PT pulses.   Pulmonary/Chest: Effort normal and breath sounds normal. He has no wheezes.   Abdominal: Abdomen is soft. He exhibits no distension. There is no abdominal tenderness.   Musculoskeletal:      Comments: Left knee deformity with tenderness to the left distal femur.      Neurological: He is alert.   Sensation intact to left foot.    Skin: Skin is warm.         ED Course   Pre-Assessment for Procedural Sedation    Date/Time: 1/29/2025 7:39 PM    Performed by: Starla Hidalgo MD  Authorized by: Starla Hidalgo MD        ASA Class::  Class 1 - Heathy patient. No medical history.       Mallampati Score::  Class 3 - Visualization of the soft palate and base of the uvula.    Patient/Family history of anesthesia or sedation complications: No    Sedation:     Sedation type: moderate (conscious) sedation      Sedation:  Propofol    Analgesia:  Ketamine    Sedation start:  1/29/2025 8:27 PM    Sedation end:  1/29/2025 8:36 PM    Vital signs: Vital signs monitored during sedation    Orthopedic Injury    Date/Time: 1/29/2025 8:40 PM    Performed by:  Layton Daley PA-C  Authorized by: Starla Hidalgo MD    Location procedure was performed:  [facility] EMERGENCY DEPARTMENT  Consent Done?:  Yes  Universal Protocol:     Written consent obtained?: Yes      Consent given by:  Patient    Patient identity confirmed:  DO ADRIANEB and name  Injury:     Injury location:  Knee    Location details:  Left knee    Injury type:  Dislocation    Dislocation type: lateral patellar        Pre-procedure assessment:     Distal perfusion: normal      Neurological function: normal      Range of motion: reduced      Local anesthesia used?: No        Selections made in this section will also lock the Injury type section above.:     Immobilization:  Brace  Post-procedure assessment:     Neurovascular status: Neurovascularly intact      Distal perfusion: normal      Neurological function: normal      Range of motion: improved      Patient tolerance:  Patient tolerated the procedure well with no immediate complications    Labs Reviewed   COMPREHENSIVE METABOLIC PANEL - Abnormal       Result Value    Sodium 139      Potassium 3.8      Chloride 108      CO2 21 (*)     Glucose 113 (*)     BUN 11      Creatinine 1.4      Calcium 8.3 (*)     Total Protein 6.5      Albumin 3.7      Total Bilirubin 0.8      Alkaline Phosphatase 49      AST 23      ALT 17      eGFR >60      Anion Gap 10     CBC W/ AUTO DIFFERENTIAL    WBC 5.28      RBC 5.00      Hemoglobin 14.8      Hematocrit 43.6      MCV 87      MCH 29.6      MCHC 33.9      RDW 12.9      Platelets 269      MPV 9.2      Immature Granulocytes 0.2      Gran # (ANC) 3.5      Immature Grans (Abs) 0.01      Lymph # 1.4      Mono # 0.4      Eos # 0.0      Baso # 0.03      nRBC 0      Gran % 65.8      Lymph % 25.8      Mono % 7.0      Eosinophil % 0.6      Basophil % 0.6      Differential Method Automated     PROTIME-INR    Prothrombin Time 11.6      INR 1.0            Imaging Results              X-Ray Knee 3 View Left (In process)                       X-Ray Knee 3 View Left (Final result)  Result time 01/29/25 19:06:42      Final result by Shanita Velez MD (01/29/25 19:06:42)                   Impression:      Lateral patellar dislocation.      Electronically signed by: Shanita Velez  Date:    01/29/2025  Time:    19:06               Narrative:    EXAMINATION:  XR FEMUR TWO VIEW LEFT, XR KNEE THREE VIEW LEFT, AND XR TIBIA FIBULA TWO VIEW LEFT    CLINICAL HISTORY:  Pain in unspecified knee    TECHNIQUE:  AP and lateral views of the left femur and left tibia fibula.  AP, lateral and oblique views of the left knee.    COMPARISON:  None.    FINDINGS:  Two views of the left femur demonstrate no acute fracture or dislocation.    Three views of the left knee demonstrate no acute fracture.  There is lateral patellar dislocation.  Small knee joint effusion is detected.    Two views of the left tibia and fibula demonstrate no acute fracture or dislocation.                                       X-Ray Femur Ap/Lat Left (Final result)  Result time 01/29/25 19:06:42      Final result by Shanita Velez MD (01/29/25 19:06:42)                   Impression:      Lateral patellar dislocation.      Electronically signed by: Shanita Velez  Date:    01/29/2025  Time:    19:06               Narrative:    EXAMINATION:  XR FEMUR TWO VIEW LEFT, XR KNEE THREE VIEW LEFT, AND XR TIBIA FIBULA TWO VIEW LEFT    CLINICAL HISTORY:  Pain in unspecified knee    TECHNIQUE:  AP and lateral views of the left femur and left tibia fibula.  AP, lateral and oblique views of the left knee.    COMPARISON:  None.    FINDINGS:  Two views of the left femur demonstrate no acute fracture or dislocation.    Three views of the left knee demonstrate no acute fracture.  There is lateral patellar dislocation.  Small knee joint effusion is detected.    Two views of the left tibia and fibula demonstrate no acute fracture or dislocation.                                       X-Ray Tibia Fibula 2  View Left (Final result)  Result time 01/29/25 19:06:42      Final result by Shanita Velez MD (01/29/25 19:06:42)                   Impression:      Lateral patellar dislocation.      Electronically signed by: Shanita Velez  Date:    01/29/2025  Time:    19:06               Narrative:    EXAMINATION:  XR FEMUR TWO VIEW LEFT, XR KNEE THREE VIEW LEFT, AND XR TIBIA FIBULA TWO VIEW LEFT    CLINICAL HISTORY:  Pain in unspecified knee    TECHNIQUE:  AP and lateral views of the left femur and left tibia fibula.  AP, lateral and oblique views of the left knee.    COMPARISON:  None.    FINDINGS:  Two views of the left femur demonstrate no acute fracture or dislocation.    Three views of the left knee demonstrate no acute fracture.  There is lateral patellar dislocation.  Small knee joint effusion is detected.    Two views of the left tibia and fibula demonstrate no acute fracture or dislocation.                                       Medications   ketamine injection 30 mg (30 mg Intravenous Given 1/29/25 2024)   propofol (DIPRIVAN) 10 mg/mL IVP (52 mg Intravenous Given 1/29/25 2028)     And   propofol (DIPRIVAN) 10 mg/mL IVP ( Intravenous Canceled Entry 1/29/25 2028)   sodium chloride 0.9% bolus 1,000 mL 1,000 mL (1,000 mLs Intravenous New Bag 1/29/25 2055)   ondansetron disintegrating tablet 4 mg (has no administration in time range)   HYDROmorphone injection 1 mg (1 mg Intravenous Given 1/29/25 1642)   HYDROmorphone injection 1 mg (1 mg Intravenous Given 1/29/25 1918)     Medical Decision Making  Kun Strong is a 29 y.o. male with h/o a PMHx of orthopedic surgery and tendon repair to right knee, who presents to the ED with left knee pain s/p injury PTA.      Initial vitals reassuring. Physical exam reveals left knee deformity, tenderness to the left knee, left distal femur.     Patient's presentation appears most consistent with a patella dislocation.. Ddx includes but is not limited to femur or tibia  dislocation, fractures.. Given history, physical exam, vital signs and chart review, there is low concern for open fracture, vascular injury, compartment syndrome, DVT.     I will obtain the following to better assess:  X-ray of left tib-fib, knee, femur. Immediate interventions include IV Dilaudid.     DISCLAIMER: This note was prepared with [employer] voice recognition transcription software. Garbled syntax, mangled pronouns, and other bizarre constructions may be attributed to that software system.      Amount and/or Complexity of Data Reviewed  Independent Historian: EMS     Details: See HPI.   Labs: ordered. Decision-making details documented in ED Course.  Radiology: ordered and independent interpretation performed. Decision-making details documented in ED Course.    Risk  Prescription drug management.  Parenteral controlled substances.  Drug therapy requiring intensive monitoring for toxicity.            Scribe Attestation:   Scribe #1: I performed the above scribed service and the documentation accurately describes the services I performed. I attest to the accuracy of the note.        ED Course as of 01/29/25 2144 Wed Jan 29, 2025 1752 Patient's knee x-ray appears to show a patella dislocation.  Radiology read pending [KI]   1915 Radiology agrees, patient's has a lateral patellar dislocation.  Patient has not received the 2nd dose of Dilaudid that has been ordered for him.  We will reassess pain needs for reduction after 2nd dose of Dilaudid [KI]   2038 Patient's patella successfully reduced.  Please see note [KI]   2120 Patient back to baseline after sedation. Post-reduction x-ray with patella and a proper place. Pain is well controlled. Patient declined outpatient opioid medications.  We will discharge patient with outpatient orthopedics follow up. [KI]      ED Course User Index  [KI] Starla Hidalgo MD                           Clinical Impression:  Final diagnoses:  [M25.569] Knee  pain  [S83.006A] Patellar dislocation (Primary)          ED Disposition Condition    Discharge Stable          ED Prescriptions    None       Follow-up Information       Follow up With Specialties Details Why Contact Info    Steve Hermosillo MD Family Medicine   [address] [facility] [address]  [facility] [address]  [phone]      Agustín Frost MD Orthopedic Surgery Schedule an appointment as soon as possible for a visit   [address] [place] [id number]  [phone]            I, Erica Hidalgo MD, personally performed the services described in this documentation. All medical record entries made by the scribe were at my direction and in my presence. I have reviewed the chart and agree that the record reflects my personal performance and is accurate and complete.       Starla Hidalgo MD  01/29/25 8020

## 2025-01-29 NOTE — ED TRIAGE NOTES
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 Pt with obvious deformity to left knee. Pt received 100mcg fentanyl per EMS prior to arrival. Pain 10/10 at present. Last meal intake at 1100. Sips of water in route to hospital today. Pt AAO x4. VSS.

## 2025-01-30 NOTE — DISCHARGE INSTRUCTIONS
